# Patient Record
Sex: MALE
[De-identification: names, ages, dates, MRNs, and addresses within clinical notes are randomized per-mention and may not be internally consistent; named-entity substitution may affect disease eponyms.]

---

## 2018-04-10 ENCOUNTER — HOSPITAL ENCOUNTER (OUTPATIENT)
Dept: HOSPITAL 75 - RAD | Age: 23
End: 2018-04-10
Attending: FAMILY MEDICINE
Payer: COMMERCIAL

## 2018-04-10 DIAGNOSIS — M25.562: Primary | ICD-10-CM
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
## 2018-04-10 PROCEDURE — 73562 X-RAY EXAM OF KNEE 3: CPT

## 2018-04-10 NOTE — DIAGNOSTIC IMAGING REPORT
INDICATION: Fall with left lateral knee pain.



TIME OF EXAM: 5:27 PM



FINDINGS: Three views of the left knee were obtained. Alignment

is normal. The joint spaces are well maintained. The articular

surfaces are smooth. No fracture, dislocation or effusion is

seen. There does appear to be some ossification near the patellar

tendon inferiorly. This is likely owing to prior tendinopathy.



IMPRESSION: No acute bony abnormality is detected.



Dictated by: 



  Dictated on workstation # [id number]